# Patient Record
Sex: MALE | Employment: FULL TIME | URBAN - METROPOLITAN AREA
[De-identification: names, ages, dates, MRNs, and addresses within clinical notes are randomized per-mention and may not be internally consistent; named-entity substitution may affect disease eponyms.]

---

## 2021-12-29 PROCEDURE — 88305 TISSUE EXAM BY PATHOLOGIST: CPT | Mod: 26 | Performed by: DERMATOLOGY

## 2021-12-29 PROCEDURE — 88305 TISSUE EXAM BY PATHOLOGIST: CPT | Mod: TC,ORL | Performed by: DERMATOLOGY

## 2021-12-31 ENCOUNTER — LAB REQUISITION (OUTPATIENT)
Dept: LAB | Facility: CLINIC | Age: 31
End: 2021-12-31

## 2021-12-31 DIAGNOSIS — D48.5 NEOPLASM OF UNCERTAIN BEHAVIOR OF SKIN: ICD-10-CM

## 2022-01-03 LAB
PATH REPORT.COMMENTS IMP SPEC: NORMAL
PATH REPORT.COMMENTS IMP SPEC: NORMAL
PATH REPORT.FINAL DX SPEC: NORMAL
PATH REPORT.GROSS SPEC: NORMAL
PATH REPORT.MICROSCOPIC SPEC OTHER STN: NORMAL
PATH REPORT.RELEVANT HX SPEC: NORMAL

## 2022-03-01 ENCOUNTER — TRANSCRIBE ORDERS (OUTPATIENT)
Dept: OTHER | Age: 32
End: 2022-03-01
Payer: COMMERCIAL

## 2022-03-01 DIAGNOSIS — J30.9 CHRONIC ALLERGIC RHINITIS: ICD-10-CM

## 2022-03-01 DIAGNOSIS — J34.3 HYPERTROPHY OF BOTH INFERIOR NASAL TURBINATES: ICD-10-CM

## 2022-03-01 DIAGNOSIS — R09.81 CHRONIC NASAL CONGESTION: Primary | ICD-10-CM

## 2022-03-01 DIAGNOSIS — J34.2 DEVIATED NASAL SEPTUM: ICD-10-CM

## 2022-03-08 NOTE — TELEPHONE ENCOUNTER
FUTURE VISIT INFORMATION      FUTURE VISIT INFORMATION:    Date: 3.18.22    Time: 10:30    Location: [facility]  REFERRAL INFORMATION:    Referring provider:  Dr. Garland Reyes    Referring providers clinic:  Replaced by [facility]    Reason for visit/diagnosis  Chronic Allergic Rhinitis - Recs in Care Everywhere from [facility] - Per Pt Aaron    RECORDS REQUESTED FROM:       Clinic name Comments Records Status   Replaced by [facility] 2.28.22, 2.14.22  Dr. Amy SANDERS   [facility] 8.20.21  Dr. Nena SANDERS   [facility] 6.24.21  Dr. Jose Lora    6.21.21  Alison Guthrie PA-C [facility]

## 2022-03-15 PROCEDURE — 88305 TISSUE EXAM BY PATHOLOGIST: CPT | Mod: TC,ORL | Performed by: DERMATOLOGY

## 2022-03-15 PROCEDURE — 88305 TISSUE EXAM BY PATHOLOGIST: CPT | Mod: 26 | Performed by: DERMATOLOGY

## 2022-03-16 NOTE — PROGRESS NOTES
[facility] Dermato-allergology Note  Office visit  Encounter Date: Mar 18, 2022  ____________________________________________    CC: Allergy Consult (Aaron is here today due to having constant drainage from his nose, he is going to have some surgery on his nose scheduled for April 14th. He has been to ENT and has used spray but his drainage continues and so does his frustration.)      HPI:  (03/16/22)  Mr. Aaron Montague is a(n) 31 year old male who presents today as a new patient for allergy consultation.    Today, he reports a long history of nasal congestion. He states his nose feels congested all of the time, and it has been this way for years now. He endorses a clear colored discharge from the nose, with post-nasal drip down the throat causing a related cough. Occasionally, his ears will also feel plugged, which he relates to fluid build-up. His symptoms have been slowly worsening over time. He denies sneezing, eye symptoms, or rash. He had prick and intradermal testing done in August 2021 at [facility] and was found to be negative to molds, dust mites, pollens, and pet dander. He was started on Astelin and Nasacort spray without significant relief of symptoms. He has also been evaluated by ENT several times. A sinus CT scan was noted to be normal in approximately 2020. Most recently, ENT evaluation revealed septal deviation and inferior turbinate hypertrophy with recommendation to proceed with nasal septoplasty and submucosal resection of the inferior turbinates.     Physical exam:  General: in no acute distress, well-developed, well-nourished  Eyes: no conjunctivitis  ENT: + rhinitis   Pulmonary: + cough  Skin: no rash or lesion on exposed skin    Past Medical History:   There is no problem list on file for this patient.    No past medical history on file.    Allergies:  No Known Allergies    Medications:  Current Outpatient Medications   Medication     bisoprolol (ZEBETA) 5 MG tablet      omeprazole (PRILOSEC) 20 MG DR capsule     No current facility-administered medications for this visit.       Social History:  The patient works as a caregiver. Patient has the following hobbies or non-occupational exposure: [place]. Patient also used to smoke cigarettes for 15 years but quit within the past few months to try and alleviate symptoms.     Family History:  No family history on file.   No family history of allergies    Previous Labs, Allergy Tests, Dermatopathology, Imaging:  Previous prick and intradermal testing negative for molds, dust mites, pollens, and pet dander.    Referred By: Garland Reyes MD  [facility]  [address]  [place] [address]     Allergy Tests: N/A    Past Allergy Test: Previous prick and intradermal testing negative for molds, dust mites, pollens, and pet dander    Order for Future Allergy Testing:    [x] Outpatient  [] Inpatient: [facility]..../ Bed ....       Skin Atopy (atopic dermatitis) [] Yes   [x] No .........  Contact allergies:  [] Yes   [x] No ..........  Hand eczema:   [] Yes   [x] No           Leading hand:   [x] R   [] L       [] Ambidextrous         Drug allergies:        [] Yes   [x] No  which?......    Urticaria/Angioedema  [] Yes   [x] No .........  Food Allergy:  [] Yes   [x] No  which?......  Pets :  [x] Yes   [] No  dog, cat, chickens, geese (lives on farm)         [x]  Rhinitis   [] Conjunctivitis   [x] Sinusitis   [] Polyposis   [x] Otitis   [] Pharyngitis         []  none  Operations:   [x] Tonsils (tonsillectomy)   [] Septum   [] Sinus   [] Polyposis        [] Asthma bronchiale   [] Coughing      [x]  none  Symptoms (mostly Rhinoconjunctivitis and Asthma) aggravated by:  Season   [] I   [] II   [] III   [] IV   []V   []VI   []VII   []VIII   []IX   []X   []XI   []XI     [x] perennial   Day time      [x] morning   [] noon      [] evening        [] night    [] whole day........  []  none  Location/changes    [] inside        [] outside   []  mountains    [] sea     [] others.............   [x]  none  Triggers, specific     [] animals     [] plants     [] dust              [] others ...........................    [x]  none  Triggers, others       [] work          [] psyche    [] sport            [] others .............................  [x]  none  Irritant                [] phys efforts [] smoke    [] heat/cold     [] odors  []others............... [x]  none    Order for PATCH TESTS  Reason for tests (suspected allergy): none  Known previous allergies: none  Standardized panels  [] Standard panel (40 tests)  [] Preservatives & Antimicrobials (31 tests)  [] Emulsifiers & Additives (25 tests)   [] Perfumes/Flavours & Plants (25 tests)  [] Hairdresser panel (12 tests)  [] Rubber Chemicals (22 tests)  [] Plastics (26 tests)  [] Colorants/Dyes/Food additives (20 tests)  [] Metals (implants/dental) (24 tests)  [] Local anaesthetics/NSAIDs (13 tests)  [] Antibiotics & Antimycotics (14 tests)   [] Corticosteroids (15 tests)   [] Photopatch test (62 tests)   [] others: ...      [] Patient's own products: ...    DO NOT test if chemical or biological identity is unknown!     always ask from patient the product information and safety sheets (MSDS)       Order for PRICK TESTS    Reason for tests (suspected allergy): atopy?   Known previous allergies: none    Standardized prick panels  [x] Atopic panel (20 tests) - do first 8 only  [] Pediatric Panel (12 tests)  [] Milk, Meat, Eggs, Grains (20 tests)    [x] Dust, Epithelia, Feathers (10 tests)  [] Fish, Seafood, Shellfish (17 tests)   [] Nuts, Beans (14 tests)  [] Spice, Vegetable, Fruit (17 tests)  [] Pollen Panel = Tree, Grass, Weed (24 tests)  [] Others: ...      [] Patient's own products: ...    DO NOT test if chemical or biological identity is unknown!     always ask from patient the product information and safety sheets (MSDS)     Standardized intradermal tests  [x] Penicillium notatum [x] Aspergillus  fumigatus [x] House dust mites D.far & D. pteron  [] Cat    [] dog  [] Others: ...  [] Bee venom   [] Wasp venom  !!Specific protocol with dilutions!!       Order for Drug allergy tests (prick & Intradermal & patch tests)    [] Penicillin G  [] Ampicillin [] Cefazolin   [] Ceftriaxone   [] Ceftazidime  [] Bactrim    [] Others: ...  Order for ... as test date    Atopy Screen (Placed 03/18/22)    No Substance Readings (15 min) Evaluation   POS Histamine 1mg/ml ++    NEG NaCl 0.9% -      No Substance Readings (15 min) Evaluation   1 Alternaria alternata (tenuis)  -    2 Cladosporium herbarum -    3 Aspergillus fumigatus -    4 Penicillium notatum -    5 Dermatophagoides pteronyssinus -    6 Dermatophagoides farinae -    7 Dog epithelium (canis spp) -    8 Cat hair (delfin catus) -      Dust, Epithelia, Feathers (Placed 03/18/22 )    No Substance Readings (15min) Evaluation   POS Histamine 1mg/ml ++    NEG NaCl 0.9% -      No Substance Readings (15min) Evaluation   1 Guinea pig -    2 Cattle epithelium -    3 Horse dander -    4 Mouse epithelium -    5 Rabbit epithelium -    6 Chicken feathers -    7 Duck feathers -    8 Goose feathers -    9 Hamster epithelium  -    10 Rat epithelium -    No clear allergy in prick test    Intradermal Testing (Placed 03/18/22)    No Substance Conc.  Reading (15min)  immediate Papule [mm] / Erythema [mm] Reading   (2 days)  delayed Papule [mm] / Erythema [mm] Remarks   DF Standard Dust Mite - D. Farinae 1:10 -   -    DP Standard Dust Mite - D. Pteronyssinus 1:10 -   -    A Aspergillus fumigatus  1:10 - + 5mm -    P Penicillium notatum 1:10 -   -    Conclusions: no immediate type reaction. Some delayed type reactions to the mold Aspergillus.    ________________________________    Assessment & Plan:    ==> Final Diagnosis:     # Perennial Rhinopathy with Otitis and Postnasal drip    DDx non-allergic anatomical (septum deviation, polyps, turbinate hypertrophy)  --> no signs for allergies  (repeated!)  * chronic illness with exacerbation, progression, side effects from treatment    These conclusions are made at the best of one's knowledge and belief based on the provided evidence such as patient's history and allergy test results and they can change over time or can be incomplete because of missing information's.    ==> Treatment Plan:   >> proceed with plan per ENT     Procedures Performed: Allergy tests, including prick, intradermal tests    Staff and Medical Student:      Minh Denis, MS3    Follow-up in Derm-Allergy clinic if necessary or if delayed type reaction    I spent a total of 30 minutes with Aaron Montague. This time was spent counseling the patient and/or coordinating care, explaining the allergy tests or procedures, performing allergy tests and assessing the clinical relevance.

## 2022-03-17 ENCOUNTER — LAB REQUISITION (OUTPATIENT)
Dept: LAB | Facility: CLINIC | Age: 32
End: 2022-03-17
Payer: COMMERCIAL

## 2022-03-17 DIAGNOSIS — D23.9 OTHER BENIGN NEOPLASM OF SKIN, UNSPECIFIED: ICD-10-CM

## 2022-03-18 ENCOUNTER — OFFICE VISIT (OUTPATIENT)
Dept: ALLERGY | Facility: CLINIC | Age: 32
End: 2022-03-18
Attending: OTOLARYNGOLOGY
Payer: COMMERCIAL

## 2022-03-18 ENCOUNTER — PRE VISIT (OUTPATIENT)
Dept: ALLERGY | Facility: CLINIC | Age: 32
End: 2022-03-18

## 2022-03-18 DIAGNOSIS — J34.2 NOSE SEPTUM DEVIATION: ICD-10-CM

## 2022-03-18 DIAGNOSIS — J31.0 CHRONIC RHINITIS: Primary | ICD-10-CM

## 2022-03-18 DIAGNOSIS — R09.82 POSTNASAL DRIP: ICD-10-CM

## 2022-03-18 PROCEDURE — 95004 PERQ TESTS W/ALRGNC XTRCS: CPT | Performed by: DERMATOLOGY

## 2022-03-18 PROCEDURE — 99203 OFFICE O/P NEW LOW 30 MIN: CPT | Mod: 25 | Performed by: DERMATOLOGY

## 2022-03-18 PROCEDURE — 95024 IQ TESTS W/ALLERGENIC XTRCS: CPT | Performed by: DERMATOLOGY

## 2022-03-18 RX ORDER — BISOPROLOL FUMARATE 5 MG/1
TABLET, FILM COATED ORAL
COMMUNITY
Start: 2022-02-23

## 2022-03-18 ASSESSMENT — PAIN SCALES - GENERAL: PAINLEVEL: NO PAIN (0)

## 2022-03-18 NOTE — NURSING NOTE
Chief Complaint   Patient presents with     Allergy Consult     Aaron is here today due to having constant drainage from his nose, he is going to have some surgery on his nose scheduled for April 14th. He has been to ENT and has used spray but his drainage continues and so does his frustration.     Shayne Denis, Paramedic

## 2022-04-03 ENCOUNTER — HEALTH MAINTENANCE LETTER (OUTPATIENT)
Age: 32
End: 2022-04-03

## 2022-10-03 ENCOUNTER — HEALTH MAINTENANCE LETTER (OUTPATIENT)
Age: 32
End: 2022-10-03

## 2023-02-12 ENCOUNTER — HEALTH MAINTENANCE LETTER (OUTPATIENT)
Age: 33
End: 2023-02-12

## 2024-03-09 ENCOUNTER — HEALTH MAINTENANCE LETTER (OUTPATIENT)
Age: 34
End: 2024-03-09

## 2025-03-16 ENCOUNTER — HEALTH MAINTENANCE LETTER (OUTPATIENT)
Age: 35
End: 2025-03-16